# Patient Record
Sex: MALE | Race: WHITE | NOT HISPANIC OR LATINO | Employment: OTHER | URBAN - METROPOLITAN AREA
[De-identification: names, ages, dates, MRNs, and addresses within clinical notes are randomized per-mention and may not be internally consistent; named-entity substitution may affect disease eponyms.]

---

## 2018-04-20 ENCOUNTER — OFFICE VISIT (OUTPATIENT)
Dept: DERMATOLOGY | Facility: CLINIC | Age: 68
End: 2018-04-20
Payer: COMMERCIAL

## 2018-04-20 DIAGNOSIS — Z13.89 SCREENING FOR SKIN CONDITION: ICD-10-CM

## 2018-04-20 DIAGNOSIS — L98.9 UNKNOWN SKIN LESION: Primary | ICD-10-CM

## 2018-04-20 DIAGNOSIS — L82.1 SEBORRHEIC KERATOSIS: ICD-10-CM

## 2018-04-20 PROCEDURE — 11100 PR BIOPSY OF SKIN LESION: CPT | Performed by: DERMATOLOGY

## 2018-04-20 PROCEDURE — 99203 OFFICE O/P NEW LOW 30 MIN: CPT | Performed by: DERMATOLOGY

## 2018-04-20 PROCEDURE — 88305 TISSUE EXAM BY PATHOLOGIST: CPT | Performed by: PATHOLOGY

## 2018-04-20 RX ORDER — LISINOPRIL 10 MG/1
TABLET ORAL
COMMUNITY
Start: 2018-03-26 | End: 2021-05-26 | Stop reason: DRUGHIGH

## 2018-04-20 RX ORDER — ASPIRIN 81 MG/1
TABLET ORAL
COMMUNITY
Start: 2016-05-10

## 2018-04-20 NOTE — PATIENT INSTRUCTIONS
Skin lesion probable basal cell carcinoma since this is been some present for so long probably should be  Treated with Mohs surgery await results of biopsy before scheduling will plan follow-up again 6 months  Seborrheic Keratosis  Patient reasurred these are normal growths we acquire with age no treatment needed    Screening for Dermatologic Disorders: Nothing else of concern noted on complete exam follow up in 6 months

## 2018-04-20 NOTE — PROGRESS NOTES
[address] [facility]  [address] [address]     MRN: [id number] : 1950  Encounter: [id number]  Patient Information: Verónica Chan  Chief complaint:Skin lesion    History of present illness:  59-year-old male presents secondary to a growth on his right posterior auricular area that is been present for over  12 years and slowly been getting larger occasionally has been bleeding  No previous history of any skin cancer or any other problems noted patient has been reluctant to get this taken care because of fear  No past medical history on file  No past surgical history on file  Social History   History   Alcohol use Not on file     History   Drug use: Unknown     History   Smoking Status    Former Smoker   Smokeless Tobacco    Never Used     No family history on file  Meds/Allergies   No Known Allergies    Meds:  Prior to Admission medications    Medication Sig Start Date End Date Taking?  Authorizing Provider   aspirin (ECOTRIN LOW STRENGTH) 81 mg EC tablet Take by mouth 5/10/16  Yes Historical Provider, MD   lisinopril (ZESTRIL) 10 mg tablet  3/26/18  Yes Historical Provider, MD   metoprolol tartrate (LOPRESSOR) 25 mg tablet Take 25 mg by mouth 18  Yes Historical Provider, MD       Subjective:     Review of Systems:    General: negative for - chills, fatigue, fever,  weight gain or weight loss  Psychological: negative for - anxiety, behavioral disorder, concentration difficulties, decreased libido, depression, irritability, memory difficulties, mood swings, sleep disturbances or suicidal ideation  ENT: negative for - hearing difficulties , nasal congestion, nasal discharge, oral lesions, sinus pain, sneezing, sore throat  Allergy and Immunology: negative for - hives, insect bite sensitivity,  Hematological and Lymphatic: negative for - bleeding problems, blood clots,bruising, swollen lymph nodes  Endocrine: negative for - hair pattern changes, hot flashes, malaise/lethargy, mood swings, palpitations, polydipsia/polyuria, skin changes, temperature intolerance or unexpected weight change  Respiratory: negative for - cough, hemoptysis, orthopnea, shortness of breath, or wheezing  Cardiovascular: negative for - chest pain, dyspnea on exertion, edema,  Gastrointestinal: negative for - abdominal pain, nausea/vomiting  Genito-Urinary: negative for - dysuria, incontinence, irregular/heavy menses or urinary frequency/urgency  Musculoskeletal: negative for - gait disturbance, joint pain, joint stiffness, joint swelling, muscle pain, muscular weakness  Dermatological:  As in HPI  Neurological: negative for confusion, dizziness, headaches, impaired coordination/balance, memory loss, numbness/tingling, seizures, speech problems, tremors or weakness       Objective: There were no vitals taken for this visit  Physical Exam:    General Appearance:    Alert, cooperative, no distress   Head:    Normocephalic, without obvious abnormality, atraumatic           Skin:   A full skin exam was performed including scalp, head scalp, eyes, ears, nose, lips, neck, chest, axilla, abdomen, back, buttocks, bilateral upper extremities, bilateral lower extremities, hands, feet, fingers, toes, fingernails, and toenails  16 mm pearly nodule noted on the right postauricular area with some crusting noted normal pigmented lesions normal keratotic papules with greasy stuck on appearance nothing else atypical noted on complete exam    Shave Biopsy Procedure Note    Pre-operative Diagnosis:   rule out basal cell carcinoma    Plan:  1  Instructed to keep the wound dry and covered for 24 and clean thereafter  2  Warning signs of infection were reviewed  3  Recommended that the patient use OTC acetaminophen as needed for pain     4  Return  Pending results of biopsy(ies)    Locations: right postauricular scalp    Indications:  Growing lesion    Anesthesia: Lidocaine 1% without epinephrine without added sodium bicarbonate    Procedure Details     Patient informed of the risks (including bleeding and infection) and benefits of the   procedure and Verbal informed consent obtained  The lesion and surrounding area were given a sterile prep using alcohol and draped in the usual sterile fashion  A Blue blade razor was used to obtain a specimen  Hemostasis achieved with aluminum chloride  Petrolatum and a sterile dressing applied  The specimen was sent for pathologic examination  The patient tolerated the procedure(s) well  Complications:  none  Assessment:     1  Unknown skin lesion     2  Screening for skin condition     3  Seborrheic keratosis           Plan:   Skin lesion probable basal cell carcinoma since this is been some present for so long probably should be  Treated with Mohs surgery await results of biopsy before scheduling will plan follow-up again 6 months  Seborrheic Keratosis  Patient reasurred these are normal growths we acquire with age no treatment needed  Screening for Dermatologic Disorders: Nothing else of concern noted on complete exam follow up in 6 months  Kristofer Antonio MD  4/20/2018,8:14 AM    Portions of the record may have been created with voice recognition software   Occasional wrong word or "sound a like" substitutions may have occurred due to the inherent limitations of voice recognition software   Read the chart carefully and recognize, using context, where substitutions have occurred

## 2018-05-24 ENCOUNTER — PROCEDURE VISIT (OUTPATIENT)
Dept: DERMATOLOGY | Facility: CLINIC | Age: 68
End: 2018-05-24
Payer: COMMERCIAL

## 2018-05-24 DIAGNOSIS — D23.9 NODULAR HIDRADENOMA: Primary | ICD-10-CM

## 2018-05-24 PROCEDURE — 12042 INTMD RPR N-HF/GENIT2.6-7.5: CPT | Performed by: DERMATOLOGY

## 2018-05-24 PROCEDURE — 11623 EXC S/N/H/F/G MAL+MRG 2.1-3: CPT | Performed by: DERMATOLOGY

## 2018-05-24 PROCEDURE — 88305 TISSUE EXAM BY PATHOLOGIST: CPT | Performed by: PATHOLOGY

## 2018-05-24 NOTE — PROGRESS NOTES
[address] [facility]  [address] [address]     MRN: [id number] : 1950  Encounter: [id number]  Patient Information: Ann Marie Grubbs    Subjective:     57-year-old male presents for follow-up for previously biopsied nodule hidradenoma with concerns raised by the pathologist that there was some atypia and recommendations to completely remove the lesion     Objective: There were no vitals taken for this visit  Physical Exam:    General Appearance:    Alert, cooperative, no distress   Skin:    Previous site as noted    Procedure name: Excision with intermediate layered closure        Location:   Postauricular neck  Diagnosis:  Nodular  hidradenoma    Size of lesion: 16 mm    Margins: 4 mm    Size + Margins: 24 mm    I explained the diagnosis to the patient and we recommend an excision of the lesion for diagnosis and/or treatment  Potential complications include, but are not limited to: scarring, bleeding, infection, incomplete removal, recurrence, and nerve damage  The risks, benefits, and alternatives were discussed with the patient in detail  The location of the tumor was identified, circled, and confirmed by the patient  The correct patient, site, and procedure were confirmed  Anesthesia: 1% xylocaine with 1:100,000 epinephrine 6 cc  The patient was brought into the room, prepped and draped sterilely in the usual manner and anesthesia was administered by local infiltration  A fusiform shape was drawn around the lesion, and the margins were incised to the level of the subcutaneous fat with a number 15cc Bard-Zurdo blade  The tissue was removed with sharp and blunt dissection  The lateral margins of the resulting defect were undermined with sharp and blunt dissection  Hemostasis was achieved with electrocautery    The deeper layers of the defect, including subcutaneous fat and fascia, had to be approximated to reduce tension on the suture line  Layered wound closure was performed  The wound was cleaned with saline, dried off, petroleum applied, and the wound was covered with a pressure dressing  The patient was given detailed verbal and written instructions on postoperative care  Suture for adipose/fascial/dermal layer closure: 4-0  vicryl  3 sutures interrupted    Suture used to approximate epidermis: 4-0  nylon 5 sutures interrupted    Final wound length: 3 4 cm    Follow-up in: 7 days  Patient tolerated procedure well without complications  Assessment:     1  Nodular hidradenoma           Plan:   Wound care instructions given to patient      Prior to Admission medications    Medication Sig Start Date End Date Taking? Authorizing Provider   aspirin (ECOTRIN LOW STRENGTH) 81 mg EC tablet Take by mouth 5/10/16   Historical Provider, MD   lisinopril (ZESTRIL) 10 mg tablet  3/26/18   Historical Provider, MD   metoprolol tartrate (LOPRESSOR) 25 mg tablet Take 25 mg by mouth 1/8/18   Historical Provider, MD     No Known Allergies    Amrita Souza MD  5/19/7163,50:45 AM    Portions of the record may have been created with voice recognition software   Occasional wrong word or "sound a like" substitutions may have occurred due to the inherent limitations of voice recognition software   Read the chart carefully and recognize, using context, where substitutions have occurred

## 2018-05-31 ENCOUNTER — OFFICE VISIT (OUTPATIENT)
Dept: DERMATOLOGY | Facility: CLINIC | Age: 68
End: 2018-05-31

## 2018-05-31 DIAGNOSIS — D23.9 NODULAR HIDRADENOMA: Primary | ICD-10-CM

## 2018-05-31 PROCEDURE — 99024 POSTOP FOLLOW-UP VISIT: CPT | Performed by: DERMATOLOGY

## 2018-05-31 NOTE — PROGRESS NOTES
[id number] [facility] DERMATOLOGY  [address] [address]     MRN: [id number] : 1950  Encounter: [id number]  Patient Information: Rosalva Son    Subjective:     58-year-old male presents for follow-up excision of nodular  Hidradenoma with atypia  No problems noted     Objective: There were no vitals taken for this visit  Physical Exam:    General Appearance:    Alert, cooperative, no distress   Skin:    Previous site of excision healing well    Jamila Sole Procedure:  Suture removal  Site of excision:  Right postauricular area  Wound was cleansed with saline  Wound is intact  Sutures removed  Steri-Strips applied  Biopsy revealed  Confirm diagnosis margins clear     Assessment:     1  Nodular hidradenoma           Plan:    previous site of excision healing well no further treatment needed    Prior to Admission medications    Medication Sig Start Date End Date Taking? Authorizing Provider   aspirin (ECOTRIN LOW STRENGTH) 81 mg EC tablet Take by mouth 5/10/16   Historical Provider, MD   lisinopril (ZESTRIL) 10 mg tablet  3/26/18   Historical Provider, MD   metoprolol tartrate (LOPRESSOR) 25 mg tablet Take 25 mg by mouth 18   Historical Provider, MD     No Known Allergies    Amrita Souza MD  2018,9:23 AM    Portions of the record may have been created with voice recognition software   Occasional wrong word or "sound a like" substitutions may have occurred due to the inherent limitations of voice recognition software   Read the chart carefully and recognize, using context, where substitutions have occurred

## 2018-05-31 NOTE — PATIENT INSTRUCTIONS
STERI-STRIPS (BUTTERFLY) POST SURGERY        Steri-Strips have been placed over your incision site to give added support  Please continue to bathe the area as usual     Eventually the Steri-Strips will begin to peel off on the ends  Snip the raised ends off with scissors and let the rest fall off on their own  If you have any questions, please call our office   [phone]

## 2019-01-23 ENCOUNTER — OFFICE VISIT (OUTPATIENT)
Dept: CARDIOLOGY CLINIC | Facility: CLINIC | Age: 69
End: 2019-01-23
Payer: MEDICARE

## 2019-01-23 VITALS
HEART RATE: 56 BPM | DIASTOLIC BLOOD PRESSURE: 84 MMHG | WEIGHT: 203.5 LBS | SYSTOLIC BLOOD PRESSURE: 126 MMHG | BODY MASS INDEX: 28.49 KG/M2 | HEIGHT: 71 IN

## 2019-01-23 DIAGNOSIS — I10 BENIGN ESSENTIAL HTN: ICD-10-CM

## 2019-01-23 DIAGNOSIS — I25.10 CORONARY ARTERY DISEASE INVOLVING NATIVE CORONARY ARTERY OF NATIVE HEART WITHOUT ANGINA PECTORIS: Primary | ICD-10-CM

## 2019-01-23 PROCEDURE — 93000 ELECTROCARDIOGRAM COMPLETE: CPT | Performed by: INTERNAL MEDICINE

## 2019-01-23 PROCEDURE — 99213 OFFICE O/P EST LOW 20 MIN: CPT | Performed by: INTERNAL MEDICINE

## 2019-01-23 NOTE — ASSESSMENT & PLAN NOTE
Well controlled  I think he can stop his metoprolol but he would like to continue despite it being a very low dose

## 2019-01-23 NOTE — PROGRESS NOTES
Patient ID: Anju Moser is a 70 y o  male  Plan:      Benign essential HTN  Well controlled  I think he can stop his metoprolol but he would like to continue despite it being a very low dose  Coronary artery disease involving native coronary artery of native heart without angina pectoris  Presumed small prior inferior wall MI  No recent change in exertional capacity  Follow up Plan:  One year EKG and follow-up visit  HPI:   The patient is seen in follow-up today regarding presumed prior small inferior wall MI and hypertension  No recent change in exertional capacity  He stopped taking aspirin because he just felt lazy about that  He agrees to resume this  Results for orders placed or performed in visit on 01/23/19   POCT ECG    Impression    Sinus bradycardia  Possible  Prior inferior wall MI  Otherwise normal          Most recent or relevant cardiac/vascular testing:    Exercise Myoview 3/4/2016 at [address]:  Prior inferobasal MI  Otherwise normal   Echocardiogram 6/17/2016:  Normal      History reviewed  No pertinent surgical history  CMP: No results found for: NA, K, CL, CO2, BUN, CREATININE, GLUCOSE, EGFR    Lipid Profile: No results found for: CHOL, TRIG, HDL, LDL      Review of Systems   10  point ROS  was otherwise non pertinent or negative except as per HPI or as below  Gait:   Normal        Objective:     /84   Pulse 56   Ht 5' 11" (1 803 m)   Wt 92 3 kg (203 lb 8 oz)   BMI 28 38 kg/m²     PHYSICAL EXAM:    General:  Normal appearance in no distress  Eyes:  Anicteric  Oral mucosa:  Moist   Neck:  No JVD  Carotid upstrokes are brisk without bruits  No masses  Chest:  Clear to auscultation and percussion  Cardiac:  Normal PMI  Normal S1 and S2  No murmur gallop or rub  Abdomen:  Soft and nontender  No palpable organomegaly or aortic enlargement  Extremities:  No peripheral edema  Musculoskeletal:  Symmetric     Vascular:  Femoral pulses are brisk without bruits  Popliteal pulses are intact bilaterally  Pedal pulses are intact  Neuro:  Grossly symmetric  Psych:  Alert and oriented x3  Current Outpatient Prescriptions:     aspirin (ECOTRIN LOW STRENGTH) 81 mg EC tablet, Take by mouth, Disp: , Rfl:     lisinopril (ZESTRIL) 10 mg tablet, , Disp: , Rfl:     metoprolol tartrate (LOPRESSOR) 25 mg tablet, Take 25 mg by mouth, Disp: , Rfl:   No Known Allergies  Past Medical History:   Diagnosis Date    Coronary artery disease     History of echocardiogram 06/17/2016    EF 55%, normal LVSF, trace MR    History of nuclear stress test 03/04/2016    Normal EF, prior inferobasal MI    [facility]    Hyperlipidemia     Hypertension     Myocardial infarction [facility])            History   Smoking Status    Former Smoker   Smokeless Tobacco    Never Used

## 2020-02-05 ENCOUNTER — OFFICE VISIT (OUTPATIENT)
Dept: CARDIOLOGY CLINIC | Facility: CLINIC | Age: 70
End: 2020-02-05
Payer: MEDICARE

## 2020-02-05 VITALS
SYSTOLIC BLOOD PRESSURE: 120 MMHG | WEIGHT: 190 LBS | BODY MASS INDEX: 26.6 KG/M2 | HEIGHT: 71 IN | DIASTOLIC BLOOD PRESSURE: 70 MMHG | HEART RATE: 57 BPM

## 2020-02-05 DIAGNOSIS — I10 BENIGN ESSENTIAL HTN: ICD-10-CM

## 2020-02-05 DIAGNOSIS — E78.5 DYSLIPIDEMIA: ICD-10-CM

## 2020-02-05 DIAGNOSIS — I25.10 CORONARY ARTERY DISEASE INVOLVING NATIVE CORONARY ARTERY OF NATIVE HEART WITHOUT ANGINA PECTORIS: Primary | ICD-10-CM

## 2020-02-05 PROCEDURE — 99214 OFFICE O/P EST MOD 30 MIN: CPT | Performed by: INTERNAL MEDICINE

## 2020-02-05 PROCEDURE — 93000 ELECTROCARDIOGRAM COMPLETE: CPT | Performed by: INTERNAL MEDICINE

## 2020-02-05 RX ORDER — MELOXICAM 7.5 MG/1
TABLET ORAL
COMMUNITY
Start: 2019-11-26

## 2020-02-05 RX ORDER — ROSUVASTATIN CALCIUM 20 MG/1
10 TABLET, COATED ORAL EVERY EVENING
COMMUNITY
Start: 2019-12-12

## 2020-02-05 NOTE — ASSESSMENT & PLAN NOTE
Presumed present based on a prior nuclear study showing prior inferobasal MI  However, no current symptoms and last stress test was nonischemic  Continue aspirin and statin therapy

## 2020-02-05 NOTE — PATIENT INSTRUCTIONS
Stop metoprolol  After 2 weeks off metoprolol, do a 2 week trial off crestor to see if that helps night sweats/hot flashes

## 2020-02-05 NOTE — PROGRESS NOTES
Patient ID: Paola Swan is a 78 y o  male  Plan:      Coronary artery disease involving native coronary artery of native heart without angina pectoris  Presumed present based on a prior nuclear study showing prior inferobasal MI  However, no current symptoms and last stress test was nonischemic  Continue aspirin and statin therapy  Dyslipidemia  I would like him to stay on Crestor but he is concern whether hot flashes at night are related to this medication  He will trial 2 weeks off to see if there is any improvement  Benign essential HTN  More than well controlled  Given recent purposeful weight loss he has agreed to stop the small dose of metoprolol  Follow up Plan:  I gave the patient the following instructions  Stop metoprolol  After 2 weeks off metoprolol, do a 2 week trial off crestor to see if that helps night sweats/hot flashes  If all is well return visit and EKG in 1 year  HPI:  The patient is here for follow-up regarding probable CAD, hypertension, and hyperlipidemia  In the past year he has resumed playing golf  He walks 9 holes and [place]  Through this he has lost weight  He complains of some sense of hot flashes at night but otherwise is feeling vigorous  Absolutely no chest pain chest pressure shortness of breath syncope or near syncope  Results for orders placed or performed in visit on 02/05/20   POCT ECG    Impression    NSR  WNL  Most recent or relevant cardiac/vascular testing:    Myoview 03/04/2016:  Normal ejection fraction  Prior inferobasal MI  Echocardiogram 06/17/2016:  Normal left ventricular systolic function  History reviewed  No pertinent surgical history  CMP: No results found for: NA, K, CL, CO2, BUN, CREATININE, GLUCOSE, EGFR    Lipid Profile: No results found for: CHOL, TRIG, HDL, LDL      Review of Systems   10  point ROS  was otherwise non pertinent or negative except as per HPI or as below     Gait: Normal         Objective:     /70   Pulse 57   Ht 5' 11" (1 803 m)   Wt 86 2 kg (190 lb)   BMI 26 50 kg/m²     PHYSICAL EXAM:    General:  Normal appearance in no distress  Eyes:  Anicteric  Oral mucosa:  Moist   Neck:  No JVD  Carotid upstrokes are brisk without bruits  No masses  Chest:  Clear to auscultation and percussion  Cardiac:  Normal PMI  Normal S1 and S2  No murmur gallop or rub  Abdomen:  Soft and nontender  No palpable organomegaly or aortic enlargement  Extremities:  No peripheral edema  Musculoskeletal:  Symmetric  Vascular:  Femoral pulses are brisk without bruits  Popliteal pulses are intact bilaterally  Pedal pulses are intact  Neuro:  Grossly symmetric  Psych:  Alert and oriented x3  Current Outpatient Medications:     aspirin (ECOTRIN LOW STRENGTH) 81 mg EC tablet, Take by mouth, Disp: , Rfl:     lisinopril (ZESTRIL) 10 mg tablet, , Disp: , Rfl:     meloxicam (MOBIC) 7 5 mg tablet, TAKE 1 TABLET BY MOUTH ONCE DAILY AS NEEDED FOR MODERATE PAIN, Disp: , Rfl:     rosuvastatin (CRESTOR) 20 MG tablet, Take 10 mg by mouth every evening , Disp: , Rfl:   No Known Allergies  Past Medical History:   Diagnosis Date    Coronary artery disease     History of echocardiogram 06/17/2016    EF 55%, normal LVSF, trace MR    History of nuclear stress test 03/04/2016    Normal EF, prior inferobasal MI    [facility]     Hypertension     Myocardial infarction [facility])            Social History     Tobacco Use   Smoking Status Former Smoker   Smokeless Tobacco Never Used

## 2020-02-05 NOTE — ASSESSMENT & PLAN NOTE
I would like him to stay on Crestor but he is concern whether hot flashes at night are related to this medication  He will trial 2 weeks off to see if there is any improvement

## 2020-02-05 NOTE — ASSESSMENT & PLAN NOTE
More than well controlled  Given recent purposeful weight loss he has agreed to stop the small dose of metoprolol

## 2020-08-18 ENCOUNTER — OFFICE VISIT (OUTPATIENT)
Dept: DERMATOLOGY | Facility: CLINIC | Age: 70
End: 2020-08-18
Payer: MEDICARE

## 2020-08-18 VITALS — TEMPERATURE: 95.4 F

## 2020-08-18 DIAGNOSIS — Z13.89 SCREENING FOR SKIN CONDITION: ICD-10-CM

## 2020-08-18 DIAGNOSIS — L30.0 NUMMULAR DERMATITIS: Primary | ICD-10-CM

## 2020-08-18 DIAGNOSIS — L82.1 SEBORRHEIC KERATOSIS: ICD-10-CM

## 2020-08-18 DIAGNOSIS — B35.3 TINEA PEDIS OF BOTH FEET: ICD-10-CM

## 2020-08-18 PROCEDURE — 99214 OFFICE O/P EST MOD 30 MIN: CPT | Performed by: DERMATOLOGY

## 2020-08-18 RX ORDER — DESOXIMETASONE 2.5 MG/G
OINTMENT TOPICAL 2 TIMES DAILY
Qty: 15 G | Refills: 1 | Status: SHIPPED | OUTPATIENT
Start: 2020-08-18 | End: 2021-05-26 | Stop reason: ALTCHOICE

## 2020-08-18 NOTE — PATIENT INSTRUCTIONS
Nummular dermatitis on the lower legs could be an id reaction to the fungus on the lower feet advised him to use Lamisil cream on his feet and we will go ahead and prescribe a topical steroid to the areas on his legs see if this will resolve if not to let us know in a few weeks  Seborrheic Keratosis  Patient reasurred these are normal growths we acquire with age no treatment needed    Screening for Dermatologic Disorders: Nothing else of concern noted on complete exam follow up prn

## 2020-08-18 NOTE — PROGRESS NOTES
[facility] DERMATOLOGY  [address] [place] [address]  [facility] [address]  [phone]  [phone]     MRN: [id number] : 1950  Encounter: [id number]  Patient Information: Marcia Vickers  Chief complaint:  Skin lesions on lower legs    History of present illness:  71-year-old male who I have not seen for couple years presents because of concerns regarding lesions on his legs that he was concerned about these have been itching he has been putting alcohol on this and its has 2 spots noted 1 on the left leg 1 on the right  Past Medical History:   Diagnosis Date    Coronary artery disease     History of echocardiogram 2016    EF 55%, normal LVSF, trace MR    History of nuclear stress test 2016    Normal EF, prior inferobasal MI  Done at [facility]     Hypertension     [facility]ocardial infarction [facility])      History reviewed  No pertinent surgical history  Social History   Social History     Substance and Sexual Activity   Alcohol Use No     Social History     Substance and Sexual Activity   Drug Use No     Social History     Tobacco Use   Smoking Status Former Smoker   Smokeless Tobacco Never Used     History reviewed  No pertinent family history  Meds/Allergies   No Known Allergies    Meds:  Prior to Admission medications    Medication Sig Start Date End Date Taking?  Authorizing Provider   aspirin (ECOTRIN LOW STRENGTH) 81 mg EC tablet Take by mouth 5/10/16  Yes Historical Provider, MD   lisinopril (ZESTRIL) 10 mg tablet  3/26/18  Yes Historical Provider, MD   meloxicam (MOBIC) 7 5 mg tablet TAKE 1 TABLET BY MOUTH ONCE DAILY AS NEEDED FOR MODERATE PAIN 19  Yes Historical Provider, MD   rosuvastatin (CRESTOR) 20 MG tablet Take 10 mg by mouth every evening  19  Yes Historical Provider, MD       Subjective:     Review of Systems:    General: negative for - chills, fatigue, fever,  weight gain or weight loss  Psychological: negative for - anxiety, behavioral disorder, concentration difficulties, decreased libido, depression, irritability, memory difficulties, mood swings, sleep disturbances or suicidal ideation  ENT: negative for - hearing difficulties , nasal congestion, nasal discharge, oral lesions, sinus pain, sneezing, sore throat  Allergy and Immunology: negative for - hives, insect bite sensitivity,  Hematological and Lymphatic: negative for - bleeding problems, blood clots,bruising, swollen lymph nodes  Endocrine: negative for - hair pattern changes, hot flashes, malaise/lethargy, mood swings, palpitations, polydipsia/polyuria, skin changes, temperature intolerance or unexpected weight change  Respiratory: negative for - cough, hemoptysis, orthopnea, shortness of breath, or wheezing  Cardiovascular: negative for - chest pain, dyspnea on exertion, edema,  Gastrointestinal: negative for - abdominal pain, nausea/vomiting  Genito-Urinary: negative for - dysuria, incontinence, irregular/heavy menses or urinary frequency/urgency  Musculoskeletal: negative for - gait disturbance, joint pain, joint stiffness, joint swelling, muscle pain, muscular weakness  Dermatological:  As in HPI  Neurological: negative for confusion, dizziness, headaches, impaired coordination/balance, memory loss, numbness/tingling, seizures, speech problems, tremors or weakness       Objective:   Temp (!) 95 4 °F (35 2 °C)     Physical Exam:    General Appearance:    Alert, cooperative, no distress   Head:    Normocephalic, without obvious abnormality, atraumatic           Skin:   A full skin exam was performed including scalp, head scalp, eyes, ears, nose, lips, neck, chest, axilla, abdomen, back, buttocks, bilateral upper extremities, bilateral lower extremities, hands, feet, fingers, toes, fingernails, and toenails scaling nummular patches x2  lower legs KOH prep performed and negative also scaling erythema widespread areas with hyperkeratotic nails noted     Assessment: 1  Nummular dermatitis     2  Seborrheic keratosis     3  Tinea pedis of both feet     4  Screening for skin condition           Plan:   Nummular dermatitis on the lower legs could be an id reaction to the fungus on the lower feet advised him to use Lamisil cream on his feet and we will go ahead and prescribe a topical steroid to the areas on his legs see if this will resolve if not to let us know in a few weeks  Seborrheic Keratosis  Patient reasurred these are normal growths we acquire with age no treatment needed  Screening for Dermatologic Disorders: Nothing else of concern noted on complete exam follow up maynor Brambila MD  8/18/2020,4:23 PM    Portions of the record may have been created with voice recognition software   Occasional wrong word or "sound a like" substitutions may have occurred due to the inherent limitations of voice recognition software   Read the chart carefully and recognize, using context, where substitutions have occurred

## 2021-03-05 DIAGNOSIS — Z23 ENCOUNTER FOR IMMUNIZATION: ICD-10-CM

## 2021-03-18 ENCOUNTER — IMMUNIZATIONS (OUTPATIENT)
Dept: FAMILY MEDICINE CLINIC | Facility: HOSPITAL | Age: 71
End: 2021-03-18

## 2021-03-18 DIAGNOSIS — Z23 ENCOUNTER FOR IMMUNIZATION: Primary | ICD-10-CM

## 2021-03-18 PROCEDURE — 0001A SARS-COV-2 / COVID-19 MRNA VACCINE (PFIZER-BIONTECH) 30 MCG: CPT

## 2021-03-18 PROCEDURE — 91300 SARS-COV-2 / COVID-19 MRNA VACCINE (PFIZER-BIONTECH) 30 MCG: CPT

## 2021-04-12 ENCOUNTER — IMMUNIZATIONS (OUTPATIENT)
Dept: FAMILY MEDICINE CLINIC | Facility: HOSPITAL | Age: 71
End: 2021-04-12

## 2021-04-12 DIAGNOSIS — Z23 ENCOUNTER FOR IMMUNIZATION: Primary | ICD-10-CM

## 2021-04-12 PROCEDURE — 91300 SARS-COV-2 / COVID-19 MRNA VACCINE (PFIZER-BIONTECH) 30 MCG: CPT

## 2021-04-12 PROCEDURE — 0002A SARS-COV-2 / COVID-19 MRNA VACCINE (PFIZER-BIONTECH) 30 MCG: CPT

## 2021-05-26 ENCOUNTER — OFFICE VISIT (OUTPATIENT)
Dept: CARDIOLOGY CLINIC | Facility: CLINIC | Age: 71
End: 2021-05-26
Payer: MEDICARE

## 2021-05-26 VITALS
DIASTOLIC BLOOD PRESSURE: 64 MMHG | BODY MASS INDEX: 24.64 KG/M2 | HEART RATE: 65 BPM | WEIGHT: 176 LBS | HEIGHT: 71 IN | SYSTOLIC BLOOD PRESSURE: 126 MMHG

## 2021-05-26 DIAGNOSIS — I71.4 ABDOMINAL AORTIC ANEURYSM (AAA) WITHOUT RUPTURE (HCC): ICD-10-CM

## 2021-05-26 DIAGNOSIS — I25.10 CORONARY ARTERY DISEASE INVOLVING NATIVE CORONARY ARTERY OF NATIVE HEART WITHOUT ANGINA PECTORIS: Primary | ICD-10-CM

## 2021-05-26 DIAGNOSIS — E78.5 DYSLIPIDEMIA: ICD-10-CM

## 2021-05-26 DIAGNOSIS — I10 BENIGN ESSENTIAL HTN: ICD-10-CM

## 2021-05-26 PROBLEM — I71.40 ABDOMINAL AORTIC ANEURYSM (AAA) (HCC): Status: ACTIVE | Noted: 2021-05-26

## 2021-05-26 PROCEDURE — 93000 ELECTROCARDIOGRAM COMPLETE: CPT | Performed by: INTERNAL MEDICINE

## 2021-05-26 PROCEDURE — 99214 OFFICE O/P EST MOD 30 MIN: CPT | Performed by: INTERNAL MEDICINE

## 2021-05-26 NOTE — PROGRESS NOTES
Patient ID: Princess Schuster is a 69 y o  male  Plan:      Abdominal aortic aneurysm (AAA) ([address] )  Prominent aorta on exam   Will check an ultrasound  Benign essential HTN  Too well controlled  Will stop lisinopril  Coronary artery disease involving native coronary artery of native heart without angina pectoris  Presumed present based on a prior nuclear study showing prior inferobasal MI  However, no current symptoms and last stress test was nonischemic  Continue aspirin and statin therapy  Follow up Plan/Other summary comments:  One year EKG and follow-up visit  HPI:  Patient is seen in follow-up today regarding the above  He has presumed coronary disease having had a prior abnormal nuclear stress test   No recent chest pain or change in exertional capacity  He continues to play golf avidly      Results for orders placed or performed in visit on 05/26/21   POCT ECG    Impression    NSR  WNL  Most recent or relevant cardiac/vascular testing:      Myoview 03/04/2016:  Normal ejection fraction  Prior inferobasal MI  Echocardiogram 06/17/2016:  Normal left ventricular systolic function  History reviewed  No pertinent surgical history  CMP: No results found for: NA, K, CL, CO2, BUN, CREATININE, GLUCOSE, EGFR    Lipid Profile: No results found for: CHOL, TRIG, HDL, LDL      Review of Systems   10  point ROS  was otherwise non pertinent or negative except as per HPI or as below  Gait: Normal         Objective:     /64   Pulse 65   Ht 5' 11" (1 803 m)   Wt 79 8 kg (176 lb)   BMI 24 55 kg/m²     PHYSICAL EXAM:    General:  Normal appearance in no distress  Eyes:  Anicteric  Oral mucosa:  Moist   Neck:  No JVD  Carotid upstrokes are brisk without bruits  No masses  Chest:  Clear to auscultation  Cardiac:  No palpable PMI  Normal S1 and S2  No murmur gallop or rub  Abdomen:  Soft and nontender  No palpable organomegaly    The aorta is easily palpable and seems enlarged  Extremities:  No peripheral edema  Musculoskeletal:  Symmetric  Vascular:  Femoral pulses are brisk without bruits  Popliteal pulses are intact bilaterally  Pedal pulses are intact  Neuro:  Grossly symmetric  Psych:  Alert and oriented x3  Current Outpatient Medications:     aspirin (ECOTRIN LOW STRENGTH) 81 mg EC tablet, Take by mouth, Disp: , Rfl:     metoprolol tartrate (LOPRESSOR) 25 mg tablet, Take 1 tablet (25 mg total) by mouth 2 (two) times a day, Disp: 180 tablet, Rfl: 5    rosuvastatin (CRESTOR) 20 MG tablet, Take 10 mg by mouth every evening , Disp: , Rfl:     meloxicam (MOBIC) 7 5 mg tablet, TAKE 1 TABLET BY MOUTH ONCE DAILY AS NEEDED FOR MODERATE PAIN, Disp: , Rfl:   No Known Allergies  Past Medical History:   Diagnosis Date    Coronary artery disease     History of echocardiogram 06/17/2016    EF 55%, normal LVSF, trace MR    History of nuclear stress test 03/04/2016    Normal EF, prior inferobasal MI    [facility]    Hyperlipidemia     Hypertension     Myocardial infarction [facility])            Social History     Tobacco Use   Smoking Status Former Smoker   Smokeless Tobacco Never Used

## 2021-05-26 NOTE — PATIENT INSTRUCTIONS
Stop lisinopril  Use metoprolol twice daily  Ultrasound to make sure you do not have an aneurysm in the abdomen

## 2024-02-21 PROBLEM — Z13.89 SCREENING FOR SKIN CONDITION: Status: RESOLVED | Noted: 2018-04-20 | Resolved: 2024-02-21

## 2024-11-07 ENCOUNTER — OFFICE VISIT (OUTPATIENT)
Dept: URGENT CARE | Facility: CLINIC | Age: 74
End: 2024-11-07
Payer: MEDICARE

## 2024-11-07 VITALS
OXYGEN SATURATION: 97 % | TEMPERATURE: 97.2 F | HEART RATE: 56 BPM | DIASTOLIC BLOOD PRESSURE: 62 MMHG | SYSTOLIC BLOOD PRESSURE: 150 MMHG | RESPIRATION RATE: 18 BRPM

## 2024-11-07 DIAGNOSIS — S71.111A LACERATION OF RIGHT THIGH, INITIAL ENCOUNTER: Primary | ICD-10-CM

## 2024-11-07 DIAGNOSIS — Z23 ENCOUNTER FOR IMMUNIZATION: ICD-10-CM

## 2024-11-07 PROCEDURE — 99214 OFFICE O/P EST MOD 30 MIN: CPT | Performed by: PHYSICAL MEDICINE & REHABILITATION

## 2024-11-07 PROCEDURE — 12001 RPR S/N/AX/GEN/TRNK 2.5CM/<: CPT | Performed by: PHYSICAL MEDICINE & REHABILITATION

## 2024-11-07 PROCEDURE — 90715 TDAP VACCINE 7 YRS/> IM: CPT

## 2024-11-07 PROCEDURE — G0463 HOSPITAL OUTPT CLINIC VISIT: HCPCS | Performed by: PHYSICAL MEDICINE & REHABILITATION

## 2024-11-07 RX ORDER — LIDOCAINE HYDROCHLORIDE AND EPINEPHRINE 10; 10 MG/ML; UG/ML
1 INJECTION, SOLUTION INFILTRATION; PERINEURAL ONCE
Status: DISCONTINUED | OUTPATIENT
Start: 2024-11-07 | End: 2024-11-07

## 2024-11-07 RX ORDER — GINSENG 100 MG
1 CAPSULE ORAL ONCE
Status: COMPLETED | OUTPATIENT
Start: 2024-11-07 | End: 2024-11-07

## 2024-11-07 RX ORDER — DOXYCYCLINE 100 MG/1
100 CAPSULE ORAL 2 TIMES DAILY
COMMUNITY
Start: 2024-11-05 | End: 2024-11-15

## 2024-11-07 RX ORDER — LIDOCAINE HYDROCHLORIDE 10 MG/ML
1 INJECTION, SOLUTION EPIDURAL; INFILTRATION; INTRACAUDAL; PERINEURAL ONCE
Status: COMPLETED | OUTPATIENT
Start: 2024-11-07 | End: 2024-11-07

## 2024-11-07 RX ORDER — GINSENG 100 MG
1 CAPSULE ORAL 2 TIMES DAILY
Status: DISCONTINUED | OUTPATIENT
Start: 2024-11-07 | End: 2024-11-07

## 2024-11-07 RX ADMIN — Medication 1 SMALL APPLICATION: at 17:19

## 2024-11-07 RX ADMIN — LIDOCAINE HYDROCHLORIDE 1 ML: 10 INJECTION, SOLUTION EPIDURAL; INFILTRATION; INTRACAUDAL; PERINEURAL at 16:37

## 2024-11-07 NOTE — PROGRESS NOTES
[facility]        NAME: Tucker Britt is a 73 y.o. male  : 1950    MRN: [id number]  DATE: 2024  TIME: 5:32 PM    Assessment and Plan   Laceration of right thigh, initial encounter [S71.111A]  1. Laceration of right thigh, initial encounter  Laceration repair    Tdap Vaccine greater than or equal to 8yo    lidocaine (PF) (XYLOCAINE-MPF) 1 % injection 1 mL    bacitracin topical ointment 1 small application    DISCONTINUED: bacitracin topical ointment 1 small application    DISCONTINUED: lidocaine-epinephrine (XYLOCAINE/EPINEPHRINE) 1 %-1:100,000 injection 1 mL      2. Encounter for immunization  Tdap Vaccine greater than or equal to 8yo    DISCONTINUED: lidocaine-epinephrine (XYLOCAINE/EPINEPHRINE) 1 %-1:100,000 injection 1 mL            Patient Instructions     Return in 10-14 days for removal  Keep area clean/dry and covered for first 24 hours  After 24 hours may remove dressing and allow soap and water to wash over area  May take tylenol for pain as needed  May apply ice to affected area for swelling/discomfort  Monitor for signs of infection; drainage with blood/pus, significant swelling, redness, hot to touch - if any of these symptoms occur prior to your removal appointment, follow up sooner.    Follow up with PCP in 3-5 days.  Proceed to  ER if symptoms worsen.    If tests are performed, our office will contact you with results only if changes need to made to the care plan discussed with you at the visit. You can review your full results on [facility].    Chief Complaint     Chief Complaint   Patient presents with    Laceration     To Right thigh from .          History of Present Illness       Patient is a 73 year old male presenting with a laceration to the right thigh that occurred today while the patient was attempting to thread golf balls. He states the knife he was using was similar to a . He is currently taking Aspirin daily.    Laceration          Review of Systems   Review of Systems   Constitutional: Negative.    Respiratory: Negative.     Cardiovascular: Negative.    Skin:  Positive for wound.   Neurological: Negative.          Current Medications       Current Outpatient Medications:     doxycycline hyclate (VIBRAMYCIN) 100 mg capsule, Take 100 mg by mouth 2 (two) times a day, Disp: , Rfl:     metoprolol tartrate (LOPRESSOR) 25 mg tablet, Take 1 tablet (25 mg total) by mouth 2 (two) times a day, Disp: 180 tablet, Rfl: 5    rosuvastatin (CRESTOR) 20 MG tablet, Take 10 mg by mouth every evening , Disp: , Rfl:     aspirin (ECOTRIN LOW STRENGTH) 81 mg EC tablet, Take by mouth (Patient not taking: Reported on 11/7/2024), Disp: , Rfl:     meloxicam (MOBIC) 7.5 mg tablet, TAKE 1 TABLET BY MOUTH ONCE DAILY AS NEEDED FOR MODERATE PAIN (Patient not taking: Reported on 11/7/2024), Disp: , Rfl:   No current facility-administered medications for this visit.    Current Allergies     Allergies as of 11/07/2024    (No Known Allergies)            The following portions of the patient's history were reviewed and updated as appropriate: allergies, current medications, past family history, past medical history, past social history, past surgical history and problem list.     Past Medical History:   Diagnosis Date    Coronary artery disease     History of echocardiogram 06/17/2016    EF 55%, normal LVSF, trace MR    History of nuclear stress test 03/04/2016    Normal EF, prior inferobasal MI.  Done at [facility]    Hyperlipidemia     Hypertension     Myocardial infarction (HCC)        History reviewed. No pertinent surgical history.    History reviewed. No pertinent family history.      Medications have been verified.        Objective   /62   Pulse 56   Temp (!) 97.2 °F (36.2 °C)   Resp 18   SpO2 97%        Physical Exam     Physical Exam  Vitals reviewed.   Constitutional:       General: He is not in acute distress.     Appearance: He is not  ill-appearing.   Cardiovascular:      Rate and Rhythm: Normal rate and regular rhythm.      Pulses: Normal pulses.      Heart sounds: Normal heart sounds.   Pulmonary:      Effort: Pulmonary effort is normal. No respiratory distress.      Breath sounds: Normal breath sounds.   Musculoskeletal:         General: Tenderness and signs of injury present.   Skin:     Comments: Laceration right thigh   Neurological:      Mental Status: He is alert and oriented to person, place, and time.      Motor: No weakness.      Gait: Gait normal.   Psychiatric:         Mood and Affect: Mood normal.         Behavior: Behavior normal.               Universal Protocol:  procedure performed by consultantConsent: Verbal consent obtained.  Risks and benefits: risks, benefits and alternatives were discussed  Consent given by: patient  Patient understanding: patient states understanding of the procedure being performed  Patient consent: the patient's understanding of the procedure matches consent given  Patient identity confirmed: verbally with patient  Laceration repair    Date/Time: 11/7/2024 3:00 PM    Performed by: Charlene Suresh PA-C  Authorized by: Charlene Suresh PA-C  Body area: lower extremity  Location details: right upper leg  Foreign bodies: no foreign bodies  Tendon involvement: none  Nerve involvement: none  Vascular damage: no    Anesthesia:  Local Anesthetic: lidocaine 1% with epinephrine      Procedure Details:  Preparation: Patient was prepped and draped in the usual sterile fashion.  Irrigation solution: saline  Irrigation method: syringe  Amount of cleaning: standard  Skin closure: 4-0 nylon  Number of sutures: 12  Technique: continuous  Approximation: close  Approximation difficulty: simple  Dressing: antibiotic ointment and gauze roll  Patient tolerance: patient tolerated the procedure well with no immediate complications  Comments: 3.5 inch laceration

## 2024-11-07 NOTE — PATIENT INSTRUCTIONS
Return in 10-14 days for removal  Keep area clean/dry and covered for first 24 hours  After 24 hours may remove dressing and allow soap and water to wash over area  May take tylenol for pain as needed  May apply ice to affected area for swelling/discomfort  Monitor for signs of infection; drainage with blood/pus, significant swelling, redness, hot to touch - if any of these symptoms occur prior to your removal appointment, follow up sooner.

## 2024-11-19 ENCOUNTER — OFFICE VISIT (OUTPATIENT)
Dept: URGENT CARE | Facility: CLINIC | Age: 74
End: 2024-11-19
Payer: MEDICARE

## 2024-11-19 VITALS
RESPIRATION RATE: 18 BRPM | HEART RATE: 50 BPM | SYSTOLIC BLOOD PRESSURE: 137 MMHG | TEMPERATURE: 97.9 F | OXYGEN SATURATION: 99 % | DIASTOLIC BLOOD PRESSURE: 72 MMHG

## 2024-11-19 DIAGNOSIS — Z48.02 ENCOUNTER FOR REMOVAL OF SUTURES: Primary | ICD-10-CM

## 2024-11-19 NOTE — PROGRESS NOTES
[facility]        NAME: Tucker Britt is a 73 y.o. male  : 1950    MRN: [id number]  DATE: 2024  TIME: 8:32 AM    Assessment and Plan   Encounter for removal of sutures [Z48.02]  1. Encounter for removal of sutures  Suture removal            Patient Instructions       Follow up with PCP in 3-5 days.  Proceed to  ER if symptoms worsen.    If tests are performed, our office will contact you with results only if changes need to made to the care plan discussed with you at the visit. [facility]    Chief Complaint     Chief Complaint   Patient presents with    Suture / Staple Removal     Pt is here for sutures removal on R thigh. 12 sutures placed 24. Pt reports that it is pink around the sutures         History of Present Illness       Patient is here for a suture removal. 12 intact sutures to the right thigh. Initial injury occurred 2024. Sutures placed by myself on that date.    Suture / Staple Removal        Review of Systems   Review of Systems   Constitutional: Negative.    Respiratory: Negative.     Cardiovascular: Negative.    Skin:  Positive for wound.         Current Medications       Current Outpatient Medications:     metoprolol tartrate (LOPRESSOR) 25 mg tablet, Take 1 tablet (25 mg total) by mouth 2 (two) times a day, Disp: 180 tablet, Rfl: 5    rosuvastatin (CRESTOR) 20 MG tablet, Take 10 mg by mouth every evening , Disp: , Rfl:     aspirin (ECOTRIN LOW STRENGTH) 81 mg EC tablet, Take by mouth (Patient not taking: Reported on 2024), Disp: , Rfl:     meloxicam (MOBIC) 7.5 mg tablet, TAKE 1 TABLET BY MOUTH ONCE DAILY AS NEEDED FOR MODERATE PAIN (Patient not taking: Reported on 2024), Disp: , Rfl:     Current Allergies     Allergies as of 2024    (No Known Allergies)            The following portions of the patient's history were reviewed and updated as appropriate: allergies, current medications, past family  "history, past medical history, past social history, past surgical history and problem list.     Past Medical History:   Diagnosis Date    Coronary artery disease     History of echocardiogram 06/17/2016    EF 55%, normal LVSF, trace MR    History of nuclear stress test 03/04/2016    Normal EF, prior inferobasal MI.  Done at [facility]    Hyperlipidemia     Hypertension     Myocardial infarction (HCC)        History reviewed. No pertinent surgical history.    No family history on file.      Medications have been verified.        Objective   /72   Pulse (!) 50   Temp 97.9 °F (36.6 °C)   Resp 18   SpO2 99%        Physical Exam     Physical Exam  Vitals reviewed.   Constitutional:       General: He is not in acute distress.  Cardiovascular:      Rate and Rhythm: Normal rate and regular rhythm.      Pulses: Normal pulses.      Heart sounds: Normal heart sounds.   Pulmonary:      Effort: Pulmonary effort is normal.      Breath sounds: Normal breath sounds.   Skin:     General: Skin is warm.   Neurological:      General: No focal deficit present.      Mental Status: He is alert.           Suture removal    Date/Time: 11/19/2024 8:00 AM    Performed by: Charlene Suresh PA-C  Authorized by: Charlene Suresh PA-C  Universal Protocol:  procedure performed by consultantConsent: Verbal consent obtained.  Risks and benefits: risks, benefits and alternatives were discussed  Consent given by: patient  Time out: Immediately prior to procedure a \"time out\" was called to verify the correct patient, procedure, equipment, support staff and site/side marked as required.  Patient understanding: patient states understanding of the procedure being performed  Patient consent: the patient's understanding of the procedure matches consent given  Patient identity confirmed: verbally with patient      Patient location:  Bedside  Location:     Laterality:  Right    Location:  Lower extremity    Lower extremity location:  Leg    Leg " location:  R upper leg  Procedure details:     Tools used:  Suture removal kit    Wound appearance:  No sign(s) of infection, good wound healing and clean    Number of sutures removed:  12  Post-procedure details:     Post-removal:  No dressing applied and Steri-Strips applied    Patient tolerance of procedure:  Tolerated well, no immediate complications

## 2025-06-26 ENCOUNTER — APPOINTMENT (OUTPATIENT)
Dept: LAB | Facility: CLINIC | Age: 75
End: 2025-06-26

## 2025-06-26 DIAGNOSIS — Z12.11 SPECIAL SCREENING FOR MALIGNANT NEOPLASMS, COLON: ICD-10-CM
